# Patient Record
Sex: FEMALE | Race: OTHER | NOT HISPANIC OR LATINO
[De-identification: names, ages, dates, MRNs, and addresses within clinical notes are randomized per-mention and may not be internally consistent; named-entity substitution may affect disease eponyms.]

---

## 2020-12-22 ENCOUNTER — APPOINTMENT (OUTPATIENT)
Dept: OBGYN | Facility: CLINIC | Age: 40
End: 2020-12-22
Payer: COMMERCIAL

## 2020-12-22 VITALS
HEART RATE: 64 BPM | SYSTOLIC BLOOD PRESSURE: 120 MMHG | WEIGHT: 109.63 LBS | BODY MASS INDEX: 19.43 KG/M2 | HEIGHT: 63 IN | DIASTOLIC BLOOD PRESSURE: 75 MMHG

## 2020-12-22 DIAGNOSIS — Z80.8 FAMILY HISTORY OF MALIGNANT NEOPLASM OF OTHER ORGANS OR SYSTEMS: ICD-10-CM

## 2020-12-22 PROCEDURE — 99396 PREV VISIT EST AGE 40-64: CPT

## 2020-12-22 PROCEDURE — 99072 ADDL SUPL MATRL&STAF TM PHE: CPT

## 2020-12-22 NOTE — PHYSICAL EXAM
[Appropriately responsive] : appropriately responsive [No Lymphadenopathy] : no lymphadenopathy [Regular Rate Rhythm] : regular rate rhythm [Clear to Auscultation B/L] : clear to auscultation bilaterally [Mass] : mass [Soft] : soft [Examination Of The Breasts] : a normal appearance [Labia Majora] : normal [Labia Minora] : normal [Normal] : normal [Adnexa Tenderness] : tender [FreeTextEntry6] : suspect endometriosis: tender uterosacarals, sister has endo

## 2020-12-22 NOTE — PLAN
[FreeTextEntry1] : annual\par \par Risks, benefits, alternative to combined oral contraceptives discussed. Patient told to carefully read package insert.\par \par *may consider finding pelvic laparoscopic surgeon for suspected endometriosis, but is feeling better on ocp

## 2020-12-22 NOTE — HISTORY OF PRESENT ILLNESS
[Patient reported mammogram was normal] : Patient reported mammogram was normal [Patient reported PAP Smear was normal] : Patient reported PAP Smear was normal [Y] : Patient is sexually active [N] : Patient denies prior pregnancies [Never active] : never active [Men] : men [No] : No [Yes] : Yes [LMP unknown] : LMP unknown [unknown] : Patient is unsure of the date of her LMP [FreeTextEntry1] : pt states she does not get her period due to oral contraceptives [Mammogramdate] : 2020 [PapSmeardate] : 2018 [TextBox_11] : no cycle on lo loestrin [FreeTextEntry3] : oral contraceptives

## 2020-12-24 LAB — HPV HIGH+LOW RISK DNA PNL CVX: NOT DETECTED

## 2020-12-27 LAB — CYTOLOGY CVX/VAG DOC THIN PREP: NORMAL

## 2020-12-28 ENCOUNTER — TRANSCRIPTION ENCOUNTER (OUTPATIENT)
Age: 40
End: 2020-12-28

## 2021-06-14 ENCOUNTER — RX RENEWAL (OUTPATIENT)
Age: 41
End: 2021-06-14

## 2021-06-14 RX ORDER — NORETHINDRONE ACETATE AND ETHINYL ESTRADIOL, ETHINYL ESTRADIOL AND FERROUS FUMARATE 1MG-10(24)
1 MG-10 MCG / KIT ORAL DAILY
Qty: 84 | Refills: 2 | Status: ACTIVE | COMMUNITY
Start: 2020-12-22 | End: 1900-01-01

## 2022-01-19 RX ORDER — NORETHINDRONE ACETATE AND ETHINYL ESTRADIOL, ETHINYL ESTRADIOL AND FERROUS FUMARATE 1MG-10(24)
1 MG-10 MCG / KIT ORAL DAILY
Qty: 84 | Refills: 1 | Status: ACTIVE | COMMUNITY
Start: 2022-01-19 | End: 1900-01-01

## 2022-05-02 ENCOUNTER — APPOINTMENT (OUTPATIENT)
Dept: OBGYN | Facility: CLINIC | Age: 42
End: 2022-05-02
Payer: COMMERCIAL

## 2022-05-02 VITALS
HEIGHT: 63 IN | SYSTOLIC BLOOD PRESSURE: 113 MMHG | OXYGEN SATURATION: 99 % | HEART RATE: 76 BPM | DIASTOLIC BLOOD PRESSURE: 73 MMHG | BODY MASS INDEX: 17.19 KG/M2 | WEIGHT: 97 LBS

## 2022-05-02 DIAGNOSIS — Z01.419 ENCOUNTER FOR GYNECOLOGICAL EXAMINATION (GENERAL) (ROUTINE) W/OUT ABNORMAL FINDINGS: ICD-10-CM

## 2022-05-02 PROCEDURE — 99396 PREV VISIT EST AGE 40-64: CPT

## 2022-05-02 NOTE — PHYSICAL EXAM
[Chaperone Present] : A chaperone was present in the examining room during all aspects of the physical examination [Appropriately responsive] : appropriately responsive [Alert] : alert [No Acute Distress] : no acute distress [No Lymphadenopathy] : no lymphadenopathy [Soft] : soft [Non-tender] : non-tender [Non-distended] : non-distended [No Mass] : no mass [Examination Of The Breasts] : a normal appearance [No Masses] : no breast masses were palpable [Labia Majora] : normal [Labia Minora] : normal [Normal] : normal [FreeTextEntry4] : tight pelvic floor muscles [FreeTextEntry5] : tender uterosacaral area

## 2022-05-02 NOTE — PLAN
[FreeTextEntry1] : annual: pap not needed, neg last yr\par not currently s/a\par has combination pelvic pain likely from endometriosis but also some pelvic floor tightness: refer to [facility]\par s/p breast excision with Dr Sylvester and SALIMA brink. benign: told to f/u with her\par routine mammogram and breast sono ordered\par

## 2022-05-02 NOTE — HISTORY OF PRESENT ILLNESS
[Normal Amount/Duration] :  normal amount and duration [Regular Cycle Intervals] : periods have been regular [Previously active] : previously active [Men] : men [FreeTextEntry1] : low dose pill no menses

## 2022-06-23 ENCOUNTER — RX RENEWAL (OUTPATIENT)
Age: 42
End: 2022-06-23

## 2022-06-23 RX ORDER — NORETHINDRONE ACETATE AND ETHINYL ESTRADIOL, ETHINYL ESTRADIOL AND FERROUS FUMARATE 1MG-10(24)
1 MG-10 MCG / KIT ORAL DAILY
Qty: 84 | Refills: 1 | Status: ACTIVE | COMMUNITY
Start: 2022-05-02 | End: 1900-01-01

## 2023-01-10 ENCOUNTER — RX RENEWAL (OUTPATIENT)
Age: 43
End: 2023-01-10

## 2023-03-31 ENCOUNTER — APPOINTMENT (OUTPATIENT)
Dept: OBGYN | Facility: CLINIC | Age: 43
End: 2023-03-31
Payer: COMMERCIAL

## 2023-03-31 VITALS
DIASTOLIC BLOOD PRESSURE: 70 MMHG | SYSTOLIC BLOOD PRESSURE: 110 MMHG | HEART RATE: 78 BPM | WEIGHT: 98 LBS | HEIGHT: 63 IN | BODY MASS INDEX: 17.36 KG/M2 | OXYGEN SATURATION: 96 %

## 2023-03-31 DIAGNOSIS — M62.89 OTHER SPECIFIED DISORDERS OF MUSCLE: ICD-10-CM

## 2023-03-31 PROCEDURE — 99213 OFFICE O/P EST LOW 20 MIN: CPT

## 2023-03-31 RX ORDER — NORETHINDRONE ACETATE AND ETHINYL ESTRADIOL 1MG-20(24)
1-20 KIT ORAL DAILY
Qty: 3 | Refills: 3 | Status: ACTIVE | COMMUNITY
Start: 2023-03-31 | End: 1900-01-01

## 2023-04-05 NOTE — REVIEW OF SYSTEMS
[Negative] : Cardiovascular [FreeTextEntry7] : possible sibo diagnosis in past but may not be the case, bloating from inflammation [FreeTextEntry8] : suspected endometriosis

## 2023-04-05 NOTE — PLAN
[FreeTextEntry1] : hx of pelvic pain/ bloating\par benign pelvic exam today\par \par d/w pt that we have long had concerns about endometriosis \par d/w pt r/b/a laparoscopy\par has been on ocp\par Risks, benefits, alternative to combined oral contraceptives discussed. Patient told to carefully read package insert.\par

## 2023-04-05 NOTE — PHYSICAL EXAM
[Appropriately responsive] : appropriately responsive [Alert] : alert [No Acute Distress] : no acute distress [Soft] : soft [Non-tender] : non-tender [Non-distended] : non-distended [No Mass] : no mass [Oriented x3] : oriented x3 [FreeTextEntry7] : no rebound no guarding [Labia Majora] : normal [Labia Minora] : normal [Normal] : normal [Normal Position] : in a normal position [Uterine Adnexae] : normal [FreeTextEntry5] : no cmt

## 2023-07-14 ENCOUNTER — RX RENEWAL (OUTPATIENT)
Age: 43
End: 2023-07-14

## 2023-10-03 DIAGNOSIS — R92.30 DENSE BREASTS, UNSPECIFIED: ICD-10-CM

## 2023-10-05 ENCOUNTER — RX RENEWAL (OUTPATIENT)
Age: 43
End: 2023-10-05

## 2024-01-03 ENCOUNTER — TRANSCRIPTION ENCOUNTER (OUTPATIENT)
Age: 44
End: 2024-01-03

## 2024-03-19 ENCOUNTER — RX RENEWAL (OUTPATIENT)
Age: 44
End: 2024-03-19

## 2024-06-07 ENCOUNTER — RX RENEWAL (OUTPATIENT)
Age: 44
End: 2024-06-07

## 2024-06-24 ENCOUNTER — APPOINTMENT (OUTPATIENT)
Dept: OBGYN | Facility: CLINIC | Age: 44
End: 2024-06-24
Payer: COMMERCIAL

## 2024-06-24 VITALS
DIASTOLIC BLOOD PRESSURE: 70 MMHG | SYSTOLIC BLOOD PRESSURE: 107 MMHG | RESPIRATION RATE: 18 BRPM | HEIGHT: 63 IN | HEART RATE: 65 BPM | OXYGEN SATURATION: 99 % | WEIGHT: 100 LBS | BODY MASS INDEX: 17.72 KG/M2

## 2024-06-24 DIAGNOSIS — N80.9 ENDOMETRIOSIS, UNSPECIFIED: ICD-10-CM

## 2024-06-24 DIAGNOSIS — R10.2 PELVIC AND PERINEAL PAIN: ICD-10-CM

## 2024-06-24 DIAGNOSIS — Z01.419 ENCOUNTER FOR GYNECOLOGICAL EXAMINATION (GENERAL) (ROUTINE) W/OUT ABNORMAL FINDINGS: ICD-10-CM

## 2024-06-24 PROCEDURE — 99396 PREV VISIT EST AGE 40-64: CPT

## 2024-06-24 RX ORDER — NORETHINDRONE ACETATE AND ETHINYL ESTRADIOL, ETHINYL ESTRADIOL AND FERROUS FUMARATE 1MG-10(24)
1 MG-10 MCG / KIT ORAL
Qty: 84 | Refills: 3 | Status: ACTIVE | COMMUNITY
Start: 2023-01-10 | End: 1900-01-01

## 2024-06-25 LAB — HPV HIGH+LOW RISK DNA PNL CVX: NOT DETECTED

## 2024-06-30 LAB — CYTOLOGY CVX/VAG DOC THIN PREP: NORMAL

## 2025-06-18 ENCOUNTER — NON-APPOINTMENT (OUTPATIENT)
Age: 45
End: 2025-06-18

## 2025-06-18 ENCOUNTER — APPOINTMENT (OUTPATIENT)
Dept: OBGYN | Facility: CLINIC | Age: 45
End: 2025-06-18
Payer: COMMERCIAL

## 2025-06-18 VITALS
DIASTOLIC BLOOD PRESSURE: 73 MMHG | BODY MASS INDEX: 17.71 KG/M2 | OXYGEN SATURATION: 100 % | SYSTOLIC BLOOD PRESSURE: 109 MMHG | WEIGHT: 100 LBS | HEART RATE: 58 BPM

## 2025-06-18 PROCEDURE — 99396 PREV VISIT EST AGE 40-64: CPT

## 2025-06-26 LAB — ANTI-MUELLERIAN HORMONE: 0.19 NG/ML
